# Patient Record
Sex: FEMALE | Race: ASIAN | NOT HISPANIC OR LATINO | URBAN - METROPOLITAN AREA
[De-identification: names, ages, dates, MRNs, and addresses within clinical notes are randomized per-mention and may not be internally consistent; named-entity substitution may affect disease eponyms.]

---

## 2017-06-15 ENCOUNTER — EMERGENCY (EMERGENCY)
Facility: HOSPITAL | Age: 22
LOS: 1 days | Discharge: HOME | End: 2017-06-15
Attending: EMERGENCY MEDICINE | Admitting: EMERGENCY MEDICINE
Payer: COMMERCIAL

## 2017-06-15 VITALS
OXYGEN SATURATION: 100 % | TEMPERATURE: 99 F | HEART RATE: 73 BPM | SYSTOLIC BLOOD PRESSURE: 136 MMHG | RESPIRATION RATE: 16 BRPM | DIASTOLIC BLOOD PRESSURE: 78 MMHG

## 2017-06-15 VITALS
OXYGEN SATURATION: 100 % | HEART RATE: 83 BPM | SYSTOLIC BLOOD PRESSURE: 110 MMHG | TEMPERATURE: 98 F | RESPIRATION RATE: 17 BRPM | DIASTOLIC BLOOD PRESSURE: 65 MMHG

## 2017-06-15 PROCEDURE — 99284 EMERGENCY DEPT VISIT MOD MDM: CPT | Mod: 25

## 2017-06-15 RX ORDER — FAMOTIDINE 10 MG/ML
20 INJECTION INTRAVENOUS ONCE
Qty: 0 | Refills: 0 | Status: COMPLETED | OUTPATIENT
Start: 2017-06-15 | End: 2017-06-15

## 2017-06-15 RX ORDER — ACETAMINOPHEN 500 MG
650 TABLET ORAL ONCE
Qty: 0 | Refills: 0 | Status: COMPLETED | OUTPATIENT
Start: 2017-06-15 | End: 2017-06-15

## 2017-06-15 RX ADMIN — Medication 30 MILLILITER(S): at 05:39

## 2017-06-15 RX ADMIN — Medication 650 MILLIGRAM(S): at 05:39

## 2017-06-15 RX ADMIN — FAMOTIDINE 20 MILLIGRAM(S): 10 INJECTION INTRAVENOUS at 05:39

## 2017-06-15 NOTE — ED PROVIDER NOTE - MEDICAL DECISION MAKING DETAILS
21F PMh hypothyroid p/w upper abd pain worse after spicy food and b/l lower back pain. Vitals wnl, exam notable for back reproducible ttp. benign abd. Neurovascularly intact.  abd: Likely gastritis/gerd/PUD. Clinically not pancreatitis, stephenie or other acute intraabd infection.  Back pain: Likely msk.  Tylenol, pepcid, maalox, reassess. Likely outpt pmd f/u.

## 2017-06-15 NOTE — ED ADULT NURSE NOTE - OBJECTIVE STATEMENT
Patient received in room 2. Pt. is A&O x3 and ambulatory at baseline. Pt. c/o lower abdominal pain for the past 2 days. Pt. states the pain is intermittent. Pt. states spicy food worsens the pain and pepto bismol helped. Pt. states her last bowel movement was at 11 pm last night and that is has been "hard for me to go." Pt. states she slipped and fell down 6 stairs 3 days ago, denies loss of consciousness and hitting her head. No acute distress at this time. Family at bedside. Will continue to monitor.

## 2017-06-15 NOTE — ED PROVIDER NOTE - PHYSICAL EXAMINATION
no LE edema, normal equal distal pulses.  b/l paraspinal lumbar region ttp  no spinal ttp, FROM all extremities, strength 5/5, steady unassisted gait

## 2017-06-15 NOTE — ED PROVIDER NOTE - OBJECTIVE STATEMENT
21F PMh hypothyroid p/w b/l lower back pain and abd pain. Slipped and fell down 5-6 stairs a few d ago, now c/o b/l lower back pain x2d. Also c/o minimal b/l upper abd pain, intermittent, worse after spicy food. No f/c, NVD, URI symptoms, SOB/CP, urinary complaints, weakness/numbness. took peptobismol w/ some relief. Also c/o constipation and straining to have BM, last normal BM yesterday.

## 2017-07-26 ENCOUNTER — EMERGENCY (EMERGENCY)
Facility: HOSPITAL | Age: 22
LOS: 1 days | Discharge: HOME | End: 2017-07-26
Attending: EMERGENCY MEDICINE | Admitting: EMERGENCY MEDICINE
Payer: COMMERCIAL

## 2017-07-26 VITALS
SYSTOLIC BLOOD PRESSURE: 123 MMHG | DIASTOLIC BLOOD PRESSURE: 81 MMHG | TEMPERATURE: 98 F | HEART RATE: 64 BPM | OXYGEN SATURATION: 100 % | RESPIRATION RATE: 16 BRPM

## 2017-07-26 PROCEDURE — 71020: CPT | Mod: 26

## 2017-07-26 PROCEDURE — 99284 EMERGENCY DEPT VISIT MOD MDM: CPT | Mod: 25

## 2017-07-26 NOTE — ED PROVIDER NOTE - ATTENDING CONTRIBUTION TO CARE
I performed a face to face evaluation of this patient and performed a full history and physical examination on the patient.  I agree with the resident's history, physical examination, and plan of the patient.  20yo F with congetsion/cough x 5d, p/w R 10/11 rib pain with cough and inspiration, denies injury, no fever, no chest pain, (+) mild nausea and vomiting with pain. (+) sick contacts  On exam awake & alert, PERRL, mmm, lungs CTAB no wheeze no crackle, tender across R 10/11 ribs anteriorly, RRR, abdomen soft NT/ND no rebound no guarding, no CVA tenderness, no edema, no calf tenderness, 2+ pulses b/l, neuro A&Ox3,skin warm and dry no rash

## 2017-07-26 NOTE — ED ADULT NURSE NOTE - CHIEF COMPLAINT QUOTE
c/o right rib pain for past 2 days. worse with movement and inspiration. denies chest pain/sob. denies any urinary symptoms. states had a cold recently and has been coughing a lot.

## 2017-07-26 NOTE — ED PROVIDER NOTE - OBJECTIVE STATEMENT
22yo F hx hypothyroidism p/w rib pain x 3 days. Pain is sharp, non radiating, reproducible and localized directly over the R lateral 11th/10th rib. Has been getting progressively worse especially with inspiration. Describes pain has spread to her back as well.  Miguelangel chest pain, n/v/fever, sob. OF note, pt had a serious viral infection last week w/ a resolving dry cough. 20yo F hx hypothyroidism p/w rib pain x 3 days. Pain is sharp, non radiating, reproducible and localized directly over the R lateral 11th/10th rib. Has been getting progressively worse especially with inspiration. Describes pain has spread to her back as well. Of note, pt had a serious viral infection last week w/ a resolving dry cough. Miguelangel chest pain, n/v, fever, sob.

## 2019-01-20 ENCOUNTER — EMERGENCY (EMERGENCY)
Facility: HOSPITAL | Age: 24
LOS: 1 days | Discharge: HOME | End: 2019-01-20
Attending: EMERGENCY MEDICINE | Admitting: EMERGENCY MEDICINE
Payer: COMMERCIAL

## 2019-01-20 VITALS
OXYGEN SATURATION: 97 % | RESPIRATION RATE: 16 BRPM | SYSTOLIC BLOOD PRESSURE: 116 MMHG | HEART RATE: 107 BPM | TEMPERATURE: 99 F | DIASTOLIC BLOOD PRESSURE: 71 MMHG

## 2019-01-20 VITALS
DIASTOLIC BLOOD PRESSURE: 62 MMHG | SYSTOLIC BLOOD PRESSURE: 108 MMHG | HEART RATE: 88 BPM | TEMPERATURE: 99 F | RESPIRATION RATE: 16 BRPM | OXYGEN SATURATION: 99 %

## 2019-01-20 LAB
ALBUMIN SERPL ELPH-MCNC: 4.3 G/DL — SIGNIFICANT CHANGE UP (ref 3.3–5)
ALP SERPL-CCNC: 64 U/L — SIGNIFICANT CHANGE UP (ref 40–120)
ALT FLD-CCNC: 22 U/L — SIGNIFICANT CHANGE UP (ref 4–33)
ANION GAP SERPL CALC-SCNC: 13 MMO/L — SIGNIFICANT CHANGE UP (ref 7–14)
APPEARANCE UR: CLEAR — SIGNIFICANT CHANGE UP
AST SERPL-CCNC: 16 U/L — SIGNIFICANT CHANGE UP (ref 4–32)
BACTERIA # UR AUTO: NEGATIVE — SIGNIFICANT CHANGE UP
BASOPHILS # BLD AUTO: 0.02 K/UL — SIGNIFICANT CHANGE UP (ref 0–0.2)
BASOPHILS NFR BLD AUTO: 0.3 % — SIGNIFICANT CHANGE UP (ref 0–2)
BILIRUB SERPL-MCNC: 0.3 MG/DL — SIGNIFICANT CHANGE UP (ref 0.2–1.2)
BILIRUB UR-MCNC: NEGATIVE — SIGNIFICANT CHANGE UP
BLOOD UR QL VISUAL: NEGATIVE — SIGNIFICANT CHANGE UP
BUN SERPL-MCNC: 9 MG/DL — SIGNIFICANT CHANGE UP (ref 7–23)
CALCIUM SERPL-MCNC: 9 MG/DL — SIGNIFICANT CHANGE UP (ref 8.4–10.5)
CHLORIDE SERPL-SCNC: 102 MMOL/L — SIGNIFICANT CHANGE UP (ref 98–107)
CO2 SERPL-SCNC: 21 MMOL/L — LOW (ref 22–31)
COLOR SPEC: YELLOW — SIGNIFICANT CHANGE UP
CREAT SERPL-MCNC: 0.76 MG/DL — SIGNIFICANT CHANGE UP (ref 0.5–1.3)
EOSINOPHIL # BLD AUTO: 0.02 K/UL — SIGNIFICANT CHANGE UP (ref 0–0.5)
EOSINOPHIL NFR BLD AUTO: 0.3 % — SIGNIFICANT CHANGE UP (ref 0–6)
GLUCOSE SERPL-MCNC: 93 MG/DL — SIGNIFICANT CHANGE UP (ref 70–99)
GLUCOSE UR-MCNC: NEGATIVE — SIGNIFICANT CHANGE UP
HCT VFR BLD CALC: 40.4 % — SIGNIFICANT CHANGE UP (ref 34.5–45)
HGB BLD-MCNC: 13.1 G/DL — SIGNIFICANT CHANGE UP (ref 11.5–15.5)
HYALINE CASTS # UR AUTO: SIGNIFICANT CHANGE UP
IMM GRANULOCYTES NFR BLD AUTO: 0.7 % — SIGNIFICANT CHANGE UP (ref 0–1.5)
KETONES UR-MCNC: NEGATIVE — SIGNIFICANT CHANGE UP
LEUKOCYTE ESTERASE UR-ACNC: NEGATIVE — SIGNIFICANT CHANGE UP
LYMPHOCYTES # BLD AUTO: 0.61 K/UL — LOW (ref 1–3.3)
LYMPHOCYTES # BLD AUTO: 10.1 % — LOW (ref 13–44)
MCHC RBC-ENTMCNC: 27.5 PG — SIGNIFICANT CHANGE UP (ref 27–34)
MCHC RBC-ENTMCNC: 32.4 % — SIGNIFICANT CHANGE UP (ref 32–36)
MCV RBC AUTO: 84.7 FL — SIGNIFICANT CHANGE UP (ref 80–100)
MONOCYTES # BLD AUTO: 0.56 K/UL — SIGNIFICANT CHANGE UP (ref 0–0.9)
MONOCYTES NFR BLD AUTO: 9.3 % — SIGNIFICANT CHANGE UP (ref 2–14)
NEUTROPHILS # BLD AUTO: 4.8 K/UL — SIGNIFICANT CHANGE UP (ref 1.8–7.4)
NEUTROPHILS NFR BLD AUTO: 79.3 % — HIGH (ref 43–77)
NITRITE UR-MCNC: NEGATIVE — SIGNIFICANT CHANGE UP
NRBC # FLD: 0 K/UL — LOW (ref 25–125)
PH UR: 6 — SIGNIFICANT CHANGE UP (ref 5–8)
PLATELET # BLD AUTO: 248 K/UL — SIGNIFICANT CHANGE UP (ref 150–400)
PMV BLD: 11.7 FL — SIGNIFICANT CHANGE UP (ref 7–13)
POTASSIUM SERPL-MCNC: 3.7 MMOL/L — SIGNIFICANT CHANGE UP (ref 3.5–5.3)
POTASSIUM SERPL-SCNC: 3.7 MMOL/L — SIGNIFICANT CHANGE UP (ref 3.5–5.3)
PROT SERPL-MCNC: 7.5 G/DL — SIGNIFICANT CHANGE UP (ref 6–8.3)
PROT UR-MCNC: 30 — SIGNIFICANT CHANGE UP
RBC # BLD: 4.77 M/UL — SIGNIFICANT CHANGE UP (ref 3.8–5.2)
RBC # FLD: 13.9 % — SIGNIFICANT CHANGE UP (ref 10.3–14.5)
RBC CASTS # UR COMP ASSIST: HIGH (ref 0–?)
SODIUM SERPL-SCNC: 136 MMOL/L — SIGNIFICANT CHANGE UP (ref 135–145)
SP GR SPEC: 1.02 — SIGNIFICANT CHANGE UP (ref 1–1.04)
SQUAMOUS # UR AUTO: SIGNIFICANT CHANGE UP
UROBILINOGEN FLD QL: NORMAL — SIGNIFICANT CHANGE UP
WBC # BLD: 6.05 K/UL — SIGNIFICANT CHANGE UP (ref 3.8–10.5)
WBC # FLD AUTO: 6.05 K/UL — SIGNIFICANT CHANGE UP (ref 3.8–10.5)
WBC UR QL: SIGNIFICANT CHANGE UP (ref 0–?)

## 2019-01-20 PROCEDURE — 99284 EMERGENCY DEPT VISIT MOD MDM: CPT

## 2019-01-20 PROCEDURE — 74176 CT ABD & PELVIS W/O CONTRAST: CPT | Mod: 26

## 2019-01-20 RX ORDER — SODIUM CHLORIDE 9 MG/ML
1000 INJECTION INTRAMUSCULAR; INTRAVENOUS; SUBCUTANEOUS ONCE
Qty: 0 | Refills: 0 | Status: COMPLETED | OUTPATIENT
Start: 2019-01-20 | End: 2019-01-20

## 2019-01-20 RX ORDER — CEFTRIAXONE 500 MG/1
1 INJECTION, POWDER, FOR SOLUTION INTRAMUSCULAR; INTRAVENOUS ONCE
Qty: 0 | Refills: 0 | Status: COMPLETED | OUTPATIENT
Start: 2019-01-20 | End: 2019-01-20

## 2019-01-20 RX ORDER — CEFPODOXIME PROXETIL 100 MG
1 TABLET ORAL
Qty: 28 | Refills: 0 | OUTPATIENT
Start: 2019-01-20 | End: 2019-02-02

## 2019-01-20 RX ORDER — ACETAMINOPHEN 500 MG
650 TABLET ORAL ONCE
Qty: 0 | Refills: 0 | Status: COMPLETED | OUTPATIENT
Start: 2019-01-20 | End: 2019-01-20

## 2019-01-20 RX ADMIN — Medication 650 MILLIGRAM(S): at 12:18

## 2019-01-20 RX ADMIN — SODIUM CHLORIDE 2000 MILLILITER(S): 9 INJECTION INTRAMUSCULAR; INTRAVENOUS; SUBCUTANEOUS at 12:19

## 2019-01-20 RX ADMIN — CEFTRIAXONE 100 GRAM(S): 500 INJECTION, POWDER, FOR SOLUTION INTRAMUSCULAR; INTRAVENOUS at 13:45

## 2019-01-20 NOTE — ED PROVIDER NOTE - ATTENDING CONTRIBUTION TO CARE
Rupinder: I have seen and examined the patient face to face, have reviewed and addended the HPI, PE and a/p as necessary.     22 yo F a/w UTI 3 days ago noted have increasing lower back pain and fevers despite being on Cipro.  Patient reports having worsening back pain and fevers last night with some chest pain described as tightness that resolved with aspirin.  Pt denied h/o kidney stone or early cardiac death in the family.      GEN - NAD; well appearing; A+O x3; non-toxic appearing CARD -s1s2, RRR, no M,G,R; PULM - CTA b/l, symmetric breath sounds; ABD:  +BS, ND, ttp in lower abd pain, soft, no guarding, no rebound, no masses; BACK: + CVA tenderness, Normal  spine; EXT: symmetric pulses, 2+ dp, capillary refill < 2 seconds, no clubbing, no cyanosis, no edema     22 yo F a/w UTI and lower back pain possible pyelonephritis. will check ua and eval for stone with ct.    - pain control, tylenol motrin, ct   - ceftriaxone,   - possible dc if no stone with cefpodoxime given no relief with cipro.

## 2019-01-20 NOTE — ED PROVIDER NOTE - NSFOLLOWUPINSTRUCTIONS_ED_ALL_ED_FT
Please seek care if you have new chest pain, shortness of breath, fevers, inability to eat or drink, or worsening of symptoms that brought you to the emergency room today.  Please follow up with your primary care physician in 1-2 days or as soon as possible.    Follow up with your primary care doctor regarding a pericardial cyst found on your CT scan.    Take acetaminophen 650mg every 6 hours as needed for pain or fever. See the drug information panel on the packaging for more information.    Take cefpodoxime, 200mg twice a day for 2 weeks, sent to your pharmacy.

## 2019-01-20 NOTE — ED PROVIDER NOTE - PROGRESS NOTE DETAILS
Nam pgy1. Pt feeling better, tolerated PO challenge, repeat vitals improved. Plan to dc home with cefpodoxime and PMD follow up for pyelo and pericardial cyst

## 2019-01-20 NOTE — ED PROVIDER NOTE - OBJECTIVE STATEMENT
23 F c/o abdominal pain, nausea started suddenly 3 days ago, at that time received cipro for a UTI from her PMD. She continues to have ABd pain and now flank pain b/l, chills and night sweats, and chest pain (radiates from left to right side, nonexertional, lasted 45 minutes resolved with aspirin) that starts after taking the cipro. Has not had any vomiting, blood in stool or urine, change in PO intake. No hx of kidney stone or early cardiac death in the family. Has never been pregnant before, sexually active with male partner without any contraception, occasional condom use. Former smoker.

## 2019-01-20 NOTE — ED PROVIDER NOTE - NSFOLLOWUPCLINICS_GEN_ALL_ED_FT
[facility]  Cardiology  [address] [place] [address]  Phone: [phone]  Fax:   Follow Up Time:

## 2019-01-20 NOTE — ED PROVIDER NOTE - MEDICAL DECISION MAKING DETAILS
23 F presenting with abdominal pain and dysuria x 3 days, worsening over that time w/ chills and night sweats. Likely due to UTI transitioning into pyelonephritis given  hx of recent UTI and continued ABD pain, fevers, tachycardia and chills. Chest pain is unlikely ACS as pt has few risk factors, young age, chest pain is in the setting of taking medication and not exertional. Plan: IVF, abx, analgesia

## 2019-01-21 LAB — SPECIMEN SOURCE: SIGNIFICANT CHANGE UP

## 2019-01-22 LAB — BACTERIA UR CULT: SIGNIFICANT CHANGE UP

## 2019-01-24 NOTE — ED POST DISCHARGE NOTE - DETAILS
Spoke to patient regarding recommendations from radiologist, states she has an appointment tomorrow with a cardiologist, advised to mention recommendation at appointment. States she understood and agreed. All question and concerns addressed. No complaints noted.

## 2019-01-24 NOTE — ED POST DISCHARGE NOTE - RESULT SUMMARY
Contacted by Radiology regarding recommendation for patient to have either MRI w/ contrast or CT w/ contrast of chest for further evaluation of pericardial cyst.

## 2019-02-16 ENCOUNTER — EMERGENCY (EMERGENCY)
Facility: HOSPITAL | Age: 24
LOS: 1 days | Discharge: HOME | End: 2019-02-16
Attending: EMERGENCY MEDICINE | Admitting: EMERGENCY MEDICINE
Payer: COMMERCIAL

## 2019-02-16 VITALS
HEART RATE: 80 BPM | RESPIRATION RATE: 18 BRPM | TEMPERATURE: 97 F | DIASTOLIC BLOOD PRESSURE: 79 MMHG | SYSTOLIC BLOOD PRESSURE: 121 MMHG | OXYGEN SATURATION: 100 %

## 2019-02-16 LAB
APPEARANCE UR: CLEAR — SIGNIFICANT CHANGE UP
BACTERIA # UR AUTO: NEGATIVE — SIGNIFICANT CHANGE UP
BILIRUB UR-MCNC: NEGATIVE — SIGNIFICANT CHANGE UP
BLOOD UR QL VISUAL: SIGNIFICANT CHANGE UP
COLOR SPEC: SIGNIFICANT CHANGE UP
GLUCOSE UR-MCNC: NEGATIVE — SIGNIFICANT CHANGE UP
HYALINE CASTS # UR AUTO: NEGATIVE — SIGNIFICANT CHANGE UP
KETONES UR-MCNC: NEGATIVE — SIGNIFICANT CHANGE UP
LEUKOCYTE ESTERASE UR-ACNC: SIGNIFICANT CHANGE UP
NITRITE UR-MCNC: POSITIVE — HIGH
PH UR: 6.5 — SIGNIFICANT CHANGE UP (ref 5–8)
PROT UR-MCNC: NEGATIVE — SIGNIFICANT CHANGE UP
RBC CASTS # UR COMP ASSIST: SIGNIFICANT CHANGE UP (ref 0–?)
SP GR SPEC: 1.01 — SIGNIFICANT CHANGE UP (ref 1–1.04)
SQUAMOUS # UR AUTO: SIGNIFICANT CHANGE UP
UROBILINOGEN FLD QL: NORMAL — SIGNIFICANT CHANGE UP
WBC UR QL: HIGH (ref 0–?)

## 2019-02-16 PROCEDURE — 99283 EMERGENCY DEPT VISIT LOW MDM: CPT

## 2019-02-16 RX ORDER — IBUPROFEN 200 MG
600 TABLET ORAL ONCE
Qty: 0 | Refills: 0 | Status: COMPLETED | OUTPATIENT
Start: 2019-02-16 | End: 2019-02-16

## 2019-02-16 RX ORDER — CEFPODOXIME PROXETIL 100 MG
1 TABLET ORAL
Qty: 14 | Refills: 0 | OUTPATIENT
Start: 2019-02-16 | End: 2019-02-22

## 2019-02-16 RX ADMIN — Medication 600 MILLIGRAM(S): at 10:22

## 2019-02-16 NOTE — ED PROVIDER NOTE - NSFOLLOWUPINSTRUCTIONS_ED_ALL_ED_FT
-Take cefpodoxime 100mg twice a day for 7 days.  -Follow up with your primary care provider in 24-48 hours.   -Please return to the Emergency Department if you experience any new/worsening symptoms, including but are not limited to: no improvement in 2 or 3 days, unrelenting nausea, vomiting, fever, chills, worsening abdominal pain, worsening back pain, or any other concerning symptoms.

## 2019-02-16 NOTE — ED PROVIDER NOTE - NS ED ROS FT
Esha Gifford MD:   General: denies fever, chills  HENT: denies nasal congestion, sore throat, rhinorrhea  Eyes: denies vision changes  CV: denies chest pain  Resp: denies difficulty breathing, cough  Abdominal: denies nausea, vomiting, diarrhea, abdominal pain, blood in stool, dark stool  : +pain with urination  MSK: denies recent trauma  Neuro: denies headaches, numbness, tingling, dizziness, lightheadedness.  Skin: denies new rashes  Endocrine: denies recent weight loss

## 2019-02-16 NOTE — ED PROVIDER NOTE - CLINICAL SUMMARY MEDICAL DECISION MAKING FREE TEXT BOX
After Visit Summary   1/16/2017    Bushra Levy    MRN: [id number]           Patient Information     Date Of Birth          1962        Visit Information        Provider Department      1/16/2017 10:20 AM Yanet Rossi,  [facility]        Today's Diagnoses     Anxiety    -  1     Epigastric pain           Care Instructions    We will send you to the gastroenterologist for further evaluation of your stomach pain.         Follow-ups after your visit        Additional Services     GASTROENTEROLOGY ADULT REFERRAL +/- PROCEDURE       Patient is in room number: 19    Location: [facility] - Phone: [phone] - Fax: [phone]  Arnolduage: Jesus                  Your next 10 appointments already scheduled     Feb 27, 2017 10:30 AM   LAB VISIT with [facility]   [facility] ([facility])    [address] [place] [address]   [phone]           If you are coming in for fasting labs, you will need to fast for 10-12 hours prior to your appt. Fasting labs include lipids, cholesterol, glucose, complete metabolic panel, basic metabolic panel, and triglycerides. Do not drink coffee or any other fluids. Water with medications are okay. Do not chew gum as well. If you have any further questions, please contact your health care team.                  Future tests that were ordered for you today     Open Future Orders        Priority Expected Expires Ordered    GASTROENTEROLOGY ADULT REFERRAL +/- PROCEDURE Routine  1/16/2018 1/16/2017            Who to contact     Please call your clinic at [phone] to:    Ask questions about your health    Make or cancel appointments    Discuss your medicines    Learn about your test results    Speak to your doctor   If you have compliments or concerns about an experience at your clinic, or if you wish to file a complaint, please contact [facility] Patient Relations at [phone] or email us at  [email or web address]         Additional Information About Your Visit        Smove Information     Smove is an electronic gateway that provides easy, online access to your medical records. With Smove, you can request a clinic appointment, read your test results, renew a prescription or communicate with your care team.     To sign up for Smove visit the website at [email or web address]   You will be asked to enter the access code listed below, as well as some personal information. Please follow the directions to create your username and password.     Your access code is: [email or web address]  Expires: 3/22/2017 11:35 AM     Your access code will  in 90 days. If you need help or a new code, please contact your [facility] or call [phone] for assistance.        Care EveryWhere ID     This is your Care EveryWhere ID. This could be used by other organizations to access your [place] medical records  [id number]        Your Vitals Were     Pulse Temperature Respirations Height BMI (Body Mass Index) Pulse Oximetry    82 98.3  F (36.8  C) (Oral) 18 4' 11\" (149.9 cm) 27.17 kg/m2 97%       Blood Pressure from Last 3 Encounters:   17 136/82   16 136/80   16 151/90    Weight from Last 3 Encounters:   17 134 lb 9.6 oz (61.054 kg)   16 131 lb 6.4 oz (59.603 kg)   16 135 lb 9.6 oz (61.508 kg)                 Today's Medication Changes          These changes are accurate as of: 17 11:12 AM.  If you have any questions, ask your nurse or doctor.               Start taking these medicines.        Dose/Directions    omeprazole 40 MG capsule   Commonly known as:  priLOSEC   Used for:  Epigastric pain   Started by:  Yanet Rossi DO        Dose:  40 mg   Take 1 capsule (40 mg) by mouth daily Take 30-60 minutes before a meal.   Quantity:  30 capsule   Refills:  1         These medicines have changed or have updated prescriptions.     "    Dose/Directions    escitalopram 20 MG tablet   Commonly known as:  LEXAPRO   This may have changed:    - medication strength  - how much to take   Used for:  Anxiety   Changed by:  Yanet Rossi DO        Dose:  20 mg   Take 1 tablet (20 mg) by mouth daily   Quantity:  90 tablet   Refills:  1            Where to get your medicines      These medications were sent to [id number] - [place], [place] - [address] [place] [address] AT [facility]  [address], [place] [address]     Phone:  [phone]    - escitalopram 20 MG tablet  - omeprazole 40 MG capsule             Primary Care Provider Office Phone # Fax #    Yanet Marva Rossi [phone]       [facility] [address] [facility] [address]        Thank you!     Thank you for choosing [facility]  for your care. Our goal is always to provide you with excellent care. Hearing back from our patients is one way we can continue to improve our services. Please take a few minutes to complete the written survey that you may receive in the mail after your visit with us. Thank you!             Your Updated Medication List - Protect others around you: Learn how to safely use, store and throw away your medicines at www.disposemymeds.org.          This list is accurate as of: 1/16/17 11:12 AM.  Always use your most recent med list.                   Brand Name Dispense Instructions for use    blood glucose monitoring lancets     1 Box    Use to test blood sugar 2 times daily or as directed.       blood glucose monitoring meter device kit     1 kit    Use to test blood sugars 1-2 times daily or as directed.       blood glucose monitoring test strip    ACCU-CHEK JACQUELIN    100 strip    Use to test blood sugars 2 times daily or as directed.       cholecalciferol 1000 UNIT tablet    vitamin D    60 tablet    Take 1 tablet (1,000 Units) by mouth daily       escitalopram 20 MG tablet     LEXAPRO    90 tablet    Take 1 tablet (20 mg) by mouth daily       IBUPROFEN PO      Take 2 tablets by mouth 3 times daily       loratadine 10 MG tablet    CLARITIN    90 tablet    Take 1 tablet (10 mg) by mouth daily as needed for allergies       omeprazole 40 MG capsule    priLOSEC    30 capsule    Take 1 capsule (40 mg) by mouth daily Take 30-60 minutes before a meal.          Esha Gifford MD: 24yo F with PMH of hypothyroidism presents with pelvic pain with urinary symptoms since 6AM today. Pt hemodynamically stable, afebrile. No pelvic or abd TTP on exam. No cervical motion tenderness or adnexal tenderness on exam. Unlikely to be PID. Concern for UTI vs pyelonephritis. Plan: UA, UC, pain control.

## 2019-02-16 NOTE — ED PROVIDER NOTE - OBJECTIVE STATEMENT
Esha Gifford MD: 22yo F with PMH of hypothyroidism presents with pelvic pain since 6AM today. Pain is constant burning sensation in pelvic area, woke pt out of her sleep, associated with dysuria, urinary frequency and hematuria, chills, non radiating, relief with phenazopyridine. Recent UTI 3 weeks ago treated with cefpodoxime. No fever, SOB, CP, N/V/D, syncope, lightheadedness, abdominal pain, vaginal bleeding/discharge, hematochezia, melena. Pt is sexually active with one partner, no hx of STDs.

## 2019-02-16 NOTE — ED ADULT TRIAGE NOTE - CHIEF COMPLAINT QUOTE
p/w with pelvic pain, painful urination and dysuria since this AM, recent UTI.  +blood in urine.  Pt denies fevers, chills, cr any other complaints at this time.

## 2019-02-16 NOTE — ED PROVIDER NOTE - ATTENDING CONTRIBUTION TO CARE
DR. HALL, ATTENDING MD-  I performed a face to face bedside interview with patient regarding history of present illness, review of symptoms and past medical history. I completed an independent physical exam.  I have discussed patient's plan of care with the resident.   Documentation as above in the note.    22 y/o female h/o hypothyroidism, recent uti tx with cefpodoxime 3 wks prior with good effect here with suprapubic discomfort, inc urinary freq, dysuria, chills.  Denies ha, neck stiffness, cp, sob, cough, abd pain, n/v/d, rash.  Afebrile, vs wnl, nad, ctabil, s1s2 rrr no m/r/g, abd soft mild suprapubic ttp no r/g, no cva tenderness b/l, no leg swelling b/l, no rash.  Concern for uti.  Obtain ucg, ua, ucx, antic dc with pcp f/u as o/p.

## 2019-02-16 NOTE — ED PROVIDER NOTE - PHYSICAL EXAMINATION
Esha Gifford MD:   CONSTITUTIONAL: Nontoxic, well nourished, well developed, young female, resting comfortably in no acute distress  HEAD: Normocephalic; atraumatic  EYES: Normal inspection, EOMI  ENMT: External appears normal; normal oropharynx  NECK: Supple; non-tender; no cervical lymphadenopathy  CARD: RRR; no audible murmurs, rubs, or gallops  RESP: No respiratory distress, lungs ctab/l  ABD: Soft, non-distended; non-tender; no rebound or guarding.   : No CVAT. No pelvic TTP.   EXT: No LE pitting edema or calf tenderness; distal pulses intact with good capillary refill  SKIN: Warm, dry, intact  NEURO: aaox3, moving all extremities spontaneously   : Chaperone Jennifer medical student. External genitalia unremarkable. Speculum exam with normal appearing whitish vaginal discharge. Vaginal wall mucosa is unremarkable.  Cervix visualized and is unremarkable (closed in appearance without any protruding material).  Bimanual exam without cervical motion tenderness, adnexal tenderness or any masses appreciated. Esha Gifford MD:   CONSTITUTIONAL: Nontoxic, well nourished, well developed, young female, resting comfortably in no acute distress  HEAD: Normocephalic; atraumatic  EYES: Normal inspection, EOMI  ENMT: External appears normal; normal oropharynx  NECK: Supple; non-tender; no cervical lymphadenopathy  CARD: RRR; no audible murmurs, rubs, or gallops  RESP: No respiratory distress, lungs ctab/l  ABD: Soft, non-distended; non-tender; no rebound or guarding.   : No CVAT. No pelvic TTP.   EXT: No LE pitting edema or calf tenderness; distal pulses intact with good capillary refill  SKIN: Warm, dry, intact  NEURO: aaox3, moving all extremities spontaneously   : Chaperone Jennifer medical student, Dr. Etienne attending physician. External genitalia unremarkable. Speculum exam with normal appearing whitish vaginal discharge. Vaginal wall mucosa is unremarkable.  Cervix visualized and is unremarkable (closed in appearance without any protruding material).  Bimanual exam without cervical motion tenderness, adnexal tenderness or any masses appreciated.

## 2019-02-17 LAB — SPECIMEN SOURCE: SIGNIFICANT CHANGE UP

## 2019-02-18 LAB
-  AMPICILLIN: SIGNIFICANT CHANGE UP
-  CIPROFLOXACIN: SIGNIFICANT CHANGE UP
-  NITROFURANTOIN: SIGNIFICANT CHANGE UP
-  TETRACYCLINE: SIGNIFICANT CHANGE UP
-  VANCOMYCIN: SIGNIFICANT CHANGE UP
BACTERIA UR CULT: SIGNIFICANT CHANGE UP
METHOD TYPE: SIGNIFICANT CHANGE UP
ORGANISM # SPEC MICROSCOPIC CNT: SIGNIFICANT CHANGE UP
ORGANISM # SPEC MICROSCOPIC CNT: SIGNIFICANT CHANGE UP

## 2019-02-19 RX ORDER — NITROFURANTOIN MACROCRYSTAL 50 MG
1 CAPSULE ORAL
Qty: 20 | Refills: 0 | OUTPATIENT
Start: 2019-02-19 | End: 2019-02-28

## 2019-02-19 NOTE — ED POST DISCHARGE NOTE - ADDITIONAL DOCUMENTATION
Tay WANG, PGY-4: pt requesting abx to be sent to different [facility] pharmacy. Resent rx for macrobid and cancelled earlier rx.

## 2020-02-05 ENCOUNTER — EMERGENCY (EMERGENCY)
Facility: HOSPITAL | Age: 25
LOS: 1 days | Discharge: HOME | End: 2020-02-05
Attending: EMERGENCY MEDICINE | Admitting: EMERGENCY MEDICINE
Payer: COMMERCIAL

## 2020-02-05 VITALS — WEIGHT: 197.98 LBS | HEIGHT: 67 IN

## 2020-02-05 VITALS
DIASTOLIC BLOOD PRESSURE: 73 MMHG | RESPIRATION RATE: 16 BRPM | HEART RATE: 85 BPM | TEMPERATURE: 98 F | SYSTOLIC BLOOD PRESSURE: 113 MMHG | OXYGEN SATURATION: 100 %

## 2020-02-05 LAB — TSH SERPL-MCNC: 14.4 UIU/ML — HIGH (ref 0.27–4.2)

## 2020-02-05 PROCEDURE — 99284 EMERGENCY DEPT VISIT MOD MDM: CPT | Mod: 25

## 2020-02-05 PROCEDURE — 93308 TTE F-UP OR LMTD: CPT | Mod: 26

## 2020-02-05 PROCEDURE — 71046 X-RAY EXAM CHEST 2 VIEWS: CPT | Mod: 26

## 2020-02-05 RX ORDER — IBUPROFEN 200 MG
600 TABLET ORAL ONCE
Refills: 0 | Status: COMPLETED | OUTPATIENT
Start: 2020-02-05 | End: 2020-02-05

## 2020-02-05 RX ADMIN — Medication 600 MILLIGRAM(S): at 11:45

## 2020-02-05 NOTE — ED ADULT NURSE NOTE - OBJECTIVE STATEMENT
Patient c/o chest pain last night and SOB today. Patient denies pain at this time. Patient AOx4 and ambulatory. Patient stable at this time. Cxray complete. Patient for re-eval

## 2020-02-05 NOTE — ED PROVIDER NOTE - PROGRESS NOTE DETAILS
HERIBERTO Velez: Upon reevaluation patient currently feels better has no complaints. Pt was advised to follow up with endocrinologist/PCP for TSH and hypothyroidism evaluation

## 2020-02-05 NOTE — ED PROVIDER NOTE - OBJECTIVE STATEMENT
24 y.o female with a PMHx of hypothyroidism-non-compliant with medication, presents to the ED complaining of having substernal over past few months worsening over the past day. Pt states the chest pain is mostly substernal radiating to the left shoulder. Pt states that the pain last night was a 8/10 for which she took tylenol which helped improve the pain. Pt currently states that is also has some associated SOB with the chest pain which actually improves with ambulation. pt currently denies having any OCP usage, recent travel, leg swelling, headaches, dizziness, nausea, vomiting, diarrhea, abdominal pain. Pt states that she vape for the past 2 years.

## 2020-02-05 NOTE — ED PROVIDER NOTE - PATIENT PORTAL LINK FT
You can access the FollowMyHealth Patient Portal offered by [facility] by registering at the following website: [email or web address]. By joining [employer]’s FollowMyHealth portal, you will also be able to view your health information using other applications (apps) compatible with our system.

## 2020-02-05 NOTE — ED ADULT TRIAGE NOTE - CHIEF COMPLAINT QUOTE
A&OX3 c/o midsternal chest pain that radiates to back, " I feel it piercing" reports sob on exertion that began last night, respirations equal and non labored sating 100% room air pt states she has a known pericardial cyst but has not followed up with PCP denies PMH

## 2020-02-05 NOTE — ED PROVIDER NOTE - CARDIAC, MLM
Normal rate, regular rhythm.  Heart sounds S1, S2.  No murmurs, rubs or gallops. reproducible chest pain on exam, in the substernal area.

## 2020-02-05 NOTE — ED PROCEDURE NOTE - PROCEDURE ADDITIONAL DETAILS
Focused ED Transthoracic echo 20395 - indication (      )    Grey scale imaging obtained in four views ( parasternal long, parasternal short, apical and subxiphoid)    No pericardial effusion noted.  No evidence of cardiac tamponade  LV contractility - normal.  EPSS 0.55cm  RV normal size.  IVC 1.93-1.16 cm with sniff.    Impression: no pericardial effusion, normal contractility, RV normal size.   Dexeus [id number].

## 2020-02-05 NOTE — ED PROVIDER NOTE - ATTENDING CONTRIBUTION TO CARE
Pineda: 24 yof with intermittent substernal chest pain for few months, radiating in between shoulder blades, not exertional, improved with twisting motion, no sour taste, no burning, no associated sob. Pt took tylenol with some improvement. Hx of 2cm pericardial cyst, no followup. Pt is well appearing, +tender to palpation over sternum. clear lungs, HR 68, 100% RA, RRR, no edema, no jvd. EKS NSR with no ST t wave changes. Will check cxr r/o ptx, nsaids for sx relief. unlikely to be cad or aorta given age and sxs. outpt cardiology follow up.

## 2020-11-10 NOTE — ED POST DISCHARGE NOTE - RESULT SUMMARY
UCX : Enterococcus faecalis 10,000-49,000CFU/ml Patient discharged home with a prescription for Cefpodoxime which is not listed on S/R profile. Patient contact # [phone] s/w patient will switch to macrobid 100mg BID X 7 days. Patient instructed to follow up with MD for repeat UA/UCX. Discussed with patient need to return to ED if symptoms don't continue to improve or recur or develops any new or worsening symptoms that are of concern. 36.7

## 2021-02-21 ENCOUNTER — EMERGENCY (EMERGENCY)
Facility: HOSPITAL | Age: 26
LOS: 1 days | Discharge: HOME | End: 2021-02-21
Attending: STUDENT IN AN ORGANIZED HEALTH CARE EDUCATION/TRAINING PROGRAM | Admitting: STUDENT IN AN ORGANIZED HEALTH CARE EDUCATION/TRAINING PROGRAM
Payer: COMMERCIAL

## 2021-02-21 VITALS
DIASTOLIC BLOOD PRESSURE: 80 MMHG | HEIGHT: 67 IN | HEART RATE: 86 BPM | RESPIRATION RATE: 16 BRPM | TEMPERATURE: 98 F | OXYGEN SATURATION: 98 % | SYSTOLIC BLOOD PRESSURE: 139 MMHG

## 2021-02-21 VITALS
DIASTOLIC BLOOD PRESSURE: 71 MMHG | RESPIRATION RATE: 16 BRPM | TEMPERATURE: 98 F | OXYGEN SATURATION: 97 % | SYSTOLIC BLOOD PRESSURE: 115 MMHG | HEART RATE: 73 BPM

## 2021-02-21 LAB
ALBUMIN SERPL ELPH-MCNC: 4 G/DL — SIGNIFICANT CHANGE UP (ref 3.3–5)
ALP SERPL-CCNC: 63 U/L — SIGNIFICANT CHANGE UP (ref 40–120)
ALT FLD-CCNC: 19 U/L — SIGNIFICANT CHANGE UP (ref 4–33)
ANION GAP SERPL CALC-SCNC: 13 MMOL/L — SIGNIFICANT CHANGE UP (ref 7–14)
APPEARANCE UR: CLEAR — SIGNIFICANT CHANGE UP
AST SERPL-CCNC: 22 U/L — SIGNIFICANT CHANGE UP (ref 4–32)
BASOPHILS # BLD AUTO: 0.04 K/UL — SIGNIFICANT CHANGE UP (ref 0–0.2)
BASOPHILS NFR BLD AUTO: 0.4 % — SIGNIFICANT CHANGE UP (ref 0–2)
BILIRUB SERPL-MCNC: <0.2 MG/DL — SIGNIFICANT CHANGE UP (ref 0.2–1.2)
BILIRUB UR-MCNC: NEGATIVE — SIGNIFICANT CHANGE UP
BLD GP AB SCN SERPL QL: NEGATIVE — SIGNIFICANT CHANGE UP
BUN SERPL-MCNC: 10 MG/DL — SIGNIFICANT CHANGE UP (ref 7–23)
CALCIUM SERPL-MCNC: 9.3 MG/DL — SIGNIFICANT CHANGE UP (ref 8.4–10.5)
CHLORIDE SERPL-SCNC: 103 MMOL/L — SIGNIFICANT CHANGE UP (ref 98–107)
CO2 SERPL-SCNC: 21 MMOL/L — LOW (ref 22–31)
COLOR SPEC: SIGNIFICANT CHANGE UP
CREAT SERPL-MCNC: 0.62 MG/DL — SIGNIFICANT CHANGE UP (ref 0.5–1.3)
DIFF PNL FLD: NEGATIVE — SIGNIFICANT CHANGE UP
EOSINOPHIL # BLD AUTO: 0.07 K/UL — SIGNIFICANT CHANGE UP (ref 0–0.5)
EOSINOPHIL NFR BLD AUTO: 0.6 % — SIGNIFICANT CHANGE UP (ref 0–6)
GLUCOSE SERPL-MCNC: 76 MG/DL — SIGNIFICANT CHANGE UP (ref 70–99)
GLUCOSE UR QL: NEGATIVE — SIGNIFICANT CHANGE UP
HCG SERPL-ACNC: 114.5 MIU/ML — SIGNIFICANT CHANGE UP
HCT VFR BLD CALC: 39.9 % — SIGNIFICANT CHANGE UP (ref 34.5–45)
HGB BLD-MCNC: 12.7 G/DL — SIGNIFICANT CHANGE UP (ref 11.5–15.5)
IANC: 6.08 K/UL — SIGNIFICANT CHANGE UP (ref 1.5–8.5)
IMM GRANULOCYTES NFR BLD AUTO: 0.5 % — SIGNIFICANT CHANGE UP (ref 0–1.5)
KETONES UR-MCNC: NEGATIVE — SIGNIFICANT CHANGE UP
LEUKOCYTE ESTERASE UR-ACNC: NEGATIVE — SIGNIFICANT CHANGE UP
LYMPHOCYTES # BLD AUTO: 37.8 % — SIGNIFICANT CHANGE UP (ref 13–44)
LYMPHOCYTES # BLD AUTO: 4.22 K/UL — HIGH (ref 1–3.3)
MCHC RBC-ENTMCNC: 27.9 PG — SIGNIFICANT CHANGE UP (ref 27–34)
MCHC RBC-ENTMCNC: 31.8 GM/DL — LOW (ref 32–36)
MCV RBC AUTO: 87.7 FL — SIGNIFICANT CHANGE UP (ref 80–100)
MONOCYTES # BLD AUTO: 0.7 K/UL — SIGNIFICANT CHANGE UP (ref 0–0.9)
MONOCYTES NFR BLD AUTO: 6.3 % — SIGNIFICANT CHANGE UP (ref 2–14)
NEUTROPHILS # BLD AUTO: 6.08 K/UL — SIGNIFICANT CHANGE UP (ref 1.8–7.4)
NEUTROPHILS NFR BLD AUTO: 54.4 % — SIGNIFICANT CHANGE UP (ref 43–77)
NITRITE UR-MCNC: NEGATIVE — SIGNIFICANT CHANGE UP
NRBC # BLD: 0 /100 WBCS — SIGNIFICANT CHANGE UP
NRBC # FLD: 0 K/UL — SIGNIFICANT CHANGE UP
PH UR: 6 — SIGNIFICANT CHANGE UP (ref 5–8)
PLATELET # BLD AUTO: 304 K/UL — SIGNIFICANT CHANGE UP (ref 150–400)
POTASSIUM SERPL-MCNC: 4.3 MMOL/L — SIGNIFICANT CHANGE UP (ref 3.5–5.3)
POTASSIUM SERPL-SCNC: 4.3 MMOL/L — SIGNIFICANT CHANGE UP (ref 3.5–5.3)
PROT SERPL-MCNC: 7.6 G/DL — SIGNIFICANT CHANGE UP (ref 6–8.3)
PROT UR-MCNC: NEGATIVE — SIGNIFICANT CHANGE UP
RBC # BLD: 4.55 M/UL — SIGNIFICANT CHANGE UP (ref 3.8–5.2)
RBC # FLD: 13 % — SIGNIFICANT CHANGE UP (ref 10.3–14.5)
RH IG SCN BLD-IMP: POSITIVE — SIGNIFICANT CHANGE UP
SODIUM SERPL-SCNC: 137 MMOL/L — SIGNIFICANT CHANGE UP (ref 135–145)
SP GR SPEC: 1.02 — SIGNIFICANT CHANGE UP (ref 1.01–1.02)
UROBILINOGEN FLD QL: SIGNIFICANT CHANGE UP
WBC # BLD: 11.17 K/UL — HIGH (ref 3.8–10.5)
WBC # FLD AUTO: 11.17 K/UL — HIGH (ref 3.8–10.5)

## 2021-02-21 PROCEDURE — 93010 ELECTROCARDIOGRAM REPORT: CPT

## 2021-02-21 PROCEDURE — 99285 EMERGENCY DEPT VISIT HI MDM: CPT

## 2021-02-21 PROCEDURE — 76830 TRANSVAGINAL US NON-OB: CPT | Mod: 26

## 2021-02-21 RX ORDER — ACETAMINOPHEN 500 MG
650 TABLET ORAL ONCE
Refills: 0 | Status: COMPLETED | OUTPATIENT
Start: 2021-02-21 | End: 2021-02-21

## 2021-02-21 RX ADMIN — Medication 650 MILLIGRAM(S): at 03:00

## 2021-02-21 NOTE — CONSULT NOTE ADULT - ASSESSMENT
25y F  @4w4d by LMP 20 w/ intermittent generalized abdominal cramping improved with tylenol, and .  TVUS shows no intrauterine pregnancy or adnexal abnormalities.  Labs and vital signs stable.  Differential includes threatened AB vs. ectopic pregnancy vs. viable IUP vs. spontaneous  in progress.  Difficult to assess at this time.      Recs:  - Patient to follow up in 48 hours for repeat b-HCG in the ER  - Patient states she wishes to contact a private GYN for follow up  - Rh type:  O+.    No need for rhogam at this time.   - Ectopic precautions reviewed with patient.  Discussed with patient importance of follow up for b-HCG given unknown pregnancy location at this time.  Patient expressed understanding.  All questions and concerns addressed to patient's apparent satisfaction.   - Patient to be added to GYN b-HCG list for closer follow up.    - Patient stable for d/c home from GYN perspective.  Primary management per ED team.      d/w Dr. Micky Moran, PGY2

## 2021-02-21 NOTE — ED PROVIDER NOTE - ATTENDING CONTRIBUTION TO CARE
26yo F ho hypothyroidism pw 3- 4 days abd cramping, breast tenderness, no vaginal bleeding or discharge, + nausea, no vomiting, no urinary sx  in ED + upreg, lmp 1 month ago  likely symptoms of early pregnancy  will check hcg and obgyn follow up   abd nontender

## 2021-02-21 NOTE — ED ADULT TRIAGE NOTE - CHIEF COMPLAINT QUOTE
LMP 1/25, pt c/o abd cramping x 3 days associated with intermittent dizziness, denies hematuria, took home pregnancy tests that were - LMP 1/25, pt c/o abd cramping radiating into pelvis x 3 days associated with intermittent dizziness, denies hematuria, took home pregnancy tests that were -

## 2021-02-21 NOTE — ED PROVIDER NOTE - OBJECTIVE STATEMENT
24 y/o  presents with lower abd pain x3 days radiating to pelvis, cramping, intermittent, associated with nausea but no vomiting. denies fevers/chills cp/shortness of breath, vaginal bleeding, lmp , does not have ob-gyn.

## 2021-02-21 NOTE — CONSULT NOTE ADULT - ATTENDING COMMENTS
24y/o F  LMP  with pregnancy of unknown location; ,  TVUS shows no intrauterine pregnancy or adnexal abnormalities. Patient stable, to f/u in 48 hours.

## 2021-02-21 NOTE — ED PROVIDER NOTE - NSFOLLOWUPINSTRUCTIONS_ED_ALL_ED_FT
You were seen in the Emergency Department for pregnancy of unknown location.   1) Advance activity as tolerated.    2) Continue all previously prescribed medications as directed.    3) Follow up with your primary care physician in 24-48 hours - take copies of your results.    4) Return to the Emergency Department for worsening or persistent symptoms, and/or ANY NEW OR CONCERNING SYMPTOMS including but not limited to severely worsening pain, severe vaginal bleeding, shortness of breath, chest pain, pallor, loss of consciousness, heart palpitations, severe headache, persistent dizziness, or severe muscle aches/leg cramps.

## 2021-02-21 NOTE — ED ADULT NURSE NOTE - CHIEF COMPLAINT QUOTE
LMP 1/25, pt c/o abd cramping radiating into pelvis x 3 days associated with intermittent dizziness, denies hematuria, took home pregnancy tests that were -

## 2021-02-21 NOTE — CONSULT NOTE ADULT - SUBJECTIVE AND OBJECTIVE BOX
GYN Consult Note    25y  @4w4d by LMP  presents with 4 days of abdominal cramping.  Patient states that cramping feels like period cramps and happens on and off throughout the day.  Upon assessment patient cramping is minimal, and she states the tylenol given by ED helped.  Denies vaginal bleeding.  Denies nausea, vomiting, SOB, headache, fevers, chills.  This is unplanned pregnancy however patient has unprotected sex "often."  Patient currently does not see a GYN physician.         PAST MEDICAL & SURGICAL HISTORY:  Hypothyroidism  No significant past surgical history        REVIEW OF SYSTEMS  General: denies fevers, chills, tiredness  Skin/Breast: denies breast pain  Respiratory and Thorax: denies shortness of breath, denies cough  Cardiovascular: denies chest pain and denies palpitations  Gastrointestinal: denies abdominal pain, nausea/ vomiting	  Genitourinary: denies dysuria, increased urinary frequency, urgency	  Constitutional, Cardiovascular, Respiratory, Gastrointestinal, Genitourinary, Musculoskeletal and Integumentary review of systems are normal except as noted. 	          Allergies  Ciprofloxacin - chest pain, fever        Vital Signs Last 24 Hrs  T(C): 36.7 (2021 05:15), Max: 36.7 (2021 05:15)  T(F): 98.1 (2021 05:15), Max: 98.1 (2021 05:15)  HR: 73 (2021 05:15) (73 - 86)  BP: 139/80 (2021 00:31) (139/80 - 139/80)  BP(mean): --  RR: 16 (2021 05:15) (16 - 16)  SpO2: 97% (2021 05:15) (97% - 98%)    PHYSICAL EXAM:   Gen: NAD, alert and oriented x 3  Cardiovascular: regular   Respiratory: breathing comfortably on RA  Abd: soft, non tender, non-distended  Pelvic: closed/long, no CMT, Uterus: normal size, non tender  Adnexa: non tender, no palpable masses  Extremities: NTBL  Skin: warm and well perfused      LABS:                        12.7   11.17 )-----------( 304      ( 2021 03:24 )             39.9     02-    137  |  103  |  10  ----------------------------<  76  4.3   |  21<L>  |  0.62    Ca    9.3      2021 03:24    TPro  7.6  /  Alb  4.0  /  TBili  <0.2  /  DBili  x   /  AST  22  /  ALT  19  /  AlkPhos  63        Urinalysis Basic - ( 2021 02:47 )    Color: Light Yellow / Appearance: Clear / S.019 / pH: x  Gluc: x / Ketone: Negative  / Bili: Negative / Urobili: <2 mg/dL   Blood: x / Protein: Negative / Nitrite: Negative   Leuk Esterase: Negative / RBC: x / WBC x   Sq Epi: x / Non Sq Epi: x / Bacteria: x    hcHCG Quantitative, Serum (21 @ 03:24)   HCG Quantitative, Serum: 114.5    RADIOLOGY & ADDITIONAL STUDIES:  < from: US Transvaginal (21 @ 03:22) >  EXAM:  US TRANSVAGINAL        PROCEDURE DATE:  2021         INTERPRETATION:  CLINICAL INFORMATION: Pelvic pain, positive urine pregnancy test, pregnancy of unknown location    LMP: 2021    COMPARISON: No similar prior    TECHNIQUE:  Endovaginal pelvic sonogram only. Color and Spectral Doppler was performed.    FINDINGS:    Uterus: 7.6 cm x 4.0 cm x 4.5 cm. no focal parenchymal abnormality. Cervix is long and closed.  Endometrium: 1.3 cm. No intrauterine pregnancy identified.    Right ovary: 4.1 cm x 2.3 cm x 3.0 cm. 1.6 cm hemorrhagic corpus luteum cyst in the right ovary. Normal arterial and venous spectral waveforms.  Left ovary: 3.4 cm x 2.5 cm x 2.8 cm. 2.2 cm hemorrhagic corpus luteum cyst in the left ovary. Normal arterial and venous spectral waveforms.    Fluid: Small amount of simple free fluid adjacent to the right ovary and in the cul-de-sac.    IMPRESSION:    1. Pregnancy of unknown location. Follow-up serial quantitative hCG is advised.  2. Bilateral ovarian hemorrhagic corpus luteum cysts.        < end of copied text >

## 2021-02-21 NOTE — ED ADULT NURSE NOTE - OBJECTIVE STATEMENT
Received patient to rm 26.  Patient is awake, A&O x 3, NAD, presents to ED for abdominal pain.  Experiencing abdominal pain radiates to pelvic area with associated dizziness.  No vaginal bleeding or discharge.  History of hypothyroidism.  20g IV right hand, labs drawn and sent, primary RN, Adam given report.

## 2021-02-21 NOTE — ED PROVIDER NOTE - PHYSICAL EXAMINATION
[Const] well-appearing, resting comfortably, no acute distress  [HEENT] PERRL, EOMI, moist mucus membranes  [Neck] Supple, trachea midline  [CV] +S1/S2, no m/r/g appreciated  [Lungs] Clear to auscultations bilaterally, no adventitious lung sounds  [Abd] minimal lower abd ttp without rebound/guarding or peritoneal signs  [MSK] 5/5 upper extremity and lower extremity str bilaterally  [Skin] warm, dry, well-perfused  [Neuro] A&Ox3, Cranial Nerves II-XII intact

## 2021-02-21 NOTE — ED PROVIDER NOTE - PATIENT PORTAL LINK FT
You can access the FollowMyHealth Patient Portal offered by [facility] by registering at the following website: [email or web address]. By joining [employer]’s FollowMyHealth portal, you will also be able to view your health information using other applications (apps) compatible with our system.

## 2021-02-21 NOTE — ED PROVIDER NOTE - PROGRESS NOTE DETAILS
Hunter, PGY-2: Pt reassessed multiple times in the past several hours. Doing well, pain improved, Gyn saw patient, return in 48 hour for repeat hcg (patient prefers to find her own ob-gyn which is okay as well per gyn resident).

## 2021-02-21 NOTE — ED PROVIDER NOTE - CLINICAL SUMMARY MEDICAL DECISION MAKING FREE TEXT BOX
26 y/o , found here to have pregnancy of unknown location, LMP , abd exam benign. gyn saw patient, return for 48-hr rpt b-hcg, pt will also try to find her own ob-gyn. vss, d/c in good condition.

## 2021-02-22 ENCOUNTER — EMERGENCY (EMERGENCY)
Facility: HOSPITAL | Age: 26
LOS: 1 days | Discharge: HOME | End: 2021-02-22
Attending: EMERGENCY MEDICINE | Admitting: EMERGENCY MEDICINE
Payer: COMMERCIAL

## 2021-02-22 VITALS
RESPIRATION RATE: 16 BRPM | OXYGEN SATURATION: 100 % | DIASTOLIC BLOOD PRESSURE: 69 MMHG | HEART RATE: 80 BPM | TEMPERATURE: 98 F | SYSTOLIC BLOOD PRESSURE: 135 MMHG

## 2021-02-22 VITALS
HEART RATE: 84 BPM | SYSTOLIC BLOOD PRESSURE: 118 MMHG | DIASTOLIC BLOOD PRESSURE: 65 MMHG | HEIGHT: 67 IN | RESPIRATION RATE: 16 BRPM | OXYGEN SATURATION: 100 % | TEMPERATURE: 98 F

## 2021-02-22 LAB
ALBUMIN SERPL ELPH-MCNC: 4 G/DL — SIGNIFICANT CHANGE UP (ref 3.3–5)
ALP SERPL-CCNC: 56 U/L — SIGNIFICANT CHANGE UP (ref 40–120)
ALT FLD-CCNC: 19 U/L — SIGNIFICANT CHANGE UP (ref 4–33)
ANION GAP SERPL CALC-SCNC: 10 MMOL/L — SIGNIFICANT CHANGE UP (ref 7–14)
APPEARANCE UR: CLEAR — SIGNIFICANT CHANGE UP
AST SERPL-CCNC: 16 U/L — SIGNIFICANT CHANGE UP (ref 4–32)
BASOPHILS # BLD AUTO: 0.06 K/UL — SIGNIFICANT CHANGE UP (ref 0–0.2)
BASOPHILS NFR BLD AUTO: 0.7 % — SIGNIFICANT CHANGE UP (ref 0–2)
BILIRUB SERPL-MCNC: 0.3 MG/DL — SIGNIFICANT CHANGE UP (ref 0.2–1.2)
BILIRUB UR-MCNC: NEGATIVE — SIGNIFICANT CHANGE UP
BUN SERPL-MCNC: 7 MG/DL — SIGNIFICANT CHANGE UP (ref 7–23)
CALCIUM SERPL-MCNC: 9.4 MG/DL — SIGNIFICANT CHANGE UP (ref 8.4–10.5)
CHLORIDE SERPL-SCNC: 103 MMOL/L — SIGNIFICANT CHANGE UP (ref 98–107)
CO2 SERPL-SCNC: 20 MMOL/L — LOW (ref 22–31)
COLOR SPEC: SIGNIFICANT CHANGE UP
CREAT SERPL-MCNC: 0.67 MG/DL — SIGNIFICANT CHANGE UP (ref 0.5–1.3)
CULTURE RESULTS: SIGNIFICANT CHANGE UP
DIFF PNL FLD: NEGATIVE — SIGNIFICANT CHANGE UP
EOSINOPHIL # BLD AUTO: 0.06 K/UL — SIGNIFICANT CHANGE UP (ref 0–0.5)
EOSINOPHIL NFR BLD AUTO: 0.7 % — SIGNIFICANT CHANGE UP (ref 0–6)
GLUCOSE SERPL-MCNC: 92 MG/DL — SIGNIFICANT CHANGE UP (ref 70–99)
GLUCOSE UR QL: NEGATIVE — SIGNIFICANT CHANGE UP
HCG SERPL-ACNC: 214.3 MIU/ML — SIGNIFICANT CHANGE UP
HCT VFR BLD CALC: 41.2 % — SIGNIFICANT CHANGE UP (ref 34.5–45)
HGB BLD-MCNC: 13.7 G/DL — SIGNIFICANT CHANGE UP (ref 11.5–15.5)
IANC: 5.4 K/UL — SIGNIFICANT CHANGE UP (ref 1.5–8.5)
IMM GRANULOCYTES NFR BLD AUTO: 0.9 % — SIGNIFICANT CHANGE UP (ref 0–1.5)
KETONES UR-MCNC: NEGATIVE — SIGNIFICANT CHANGE UP
LEUKOCYTE ESTERASE UR-ACNC: NEGATIVE — SIGNIFICANT CHANGE UP
LYMPHOCYTES # BLD AUTO: 2.97 K/UL — SIGNIFICANT CHANGE UP (ref 1–3.3)
LYMPHOCYTES # BLD AUTO: 32.2 % — SIGNIFICANT CHANGE UP (ref 13–44)
MCHC RBC-ENTMCNC: 28.3 PG — SIGNIFICANT CHANGE UP (ref 27–34)
MCHC RBC-ENTMCNC: 33.3 GM/DL — SIGNIFICANT CHANGE UP (ref 32–36)
MCV RBC AUTO: 85.1 FL — SIGNIFICANT CHANGE UP (ref 80–100)
MONOCYTES # BLD AUTO: 0.65 K/UL — SIGNIFICANT CHANGE UP (ref 0–0.9)
MONOCYTES NFR BLD AUTO: 7 % — SIGNIFICANT CHANGE UP (ref 2–14)
NEUTROPHILS # BLD AUTO: 5.4 K/UL — SIGNIFICANT CHANGE UP (ref 1.8–7.4)
NEUTROPHILS NFR BLD AUTO: 58.5 % — SIGNIFICANT CHANGE UP (ref 43–77)
NITRITE UR-MCNC: NEGATIVE — SIGNIFICANT CHANGE UP
NRBC # BLD: 0 /100 WBCS — SIGNIFICANT CHANGE UP
NRBC # FLD: 0 K/UL — SIGNIFICANT CHANGE UP
PH UR: 6 — SIGNIFICANT CHANGE UP (ref 5–8)
PLATELET # BLD AUTO: 314 K/UL — SIGNIFICANT CHANGE UP (ref 150–400)
POTASSIUM SERPL-MCNC: 3.8 MMOL/L — SIGNIFICANT CHANGE UP (ref 3.5–5.3)
POTASSIUM SERPL-SCNC: 3.8 MMOL/L — SIGNIFICANT CHANGE UP (ref 3.5–5.3)
PROT SERPL-MCNC: 7.6 G/DL — SIGNIFICANT CHANGE UP (ref 6–8.3)
PROT UR-MCNC: NEGATIVE — SIGNIFICANT CHANGE UP
RBC # BLD: 4.84 M/UL — SIGNIFICANT CHANGE UP (ref 3.8–5.2)
RBC # FLD: 12.8 % — SIGNIFICANT CHANGE UP (ref 10.3–14.5)
SODIUM SERPL-SCNC: 133 MMOL/L — LOW (ref 135–145)
SP GR SPEC: 1.01 — LOW (ref 1.01–1.02)
SPECIMEN SOURCE: SIGNIFICANT CHANGE UP
UROBILINOGEN FLD QL: SIGNIFICANT CHANGE UP
WBC # BLD: 9.22 K/UL — SIGNIFICANT CHANGE UP (ref 3.8–10.5)
WBC # FLD AUTO: 9.22 K/UL — SIGNIFICANT CHANGE UP (ref 3.8–10.5)

## 2021-02-22 PROCEDURE — 76830 TRANSVAGINAL US NON-OB: CPT | Mod: 26

## 2021-02-22 PROCEDURE — 99284 EMERGENCY DEPT VISIT MOD MDM: CPT

## 2021-02-22 RX ORDER — SODIUM CHLORIDE 9 MG/ML
1000 INJECTION INTRAMUSCULAR; INTRAVENOUS; SUBCUTANEOUS ONCE
Refills: 0 | Status: COMPLETED | OUTPATIENT
Start: 2021-02-22 | End: 2021-02-22

## 2021-02-22 RX ORDER — ACETAMINOPHEN 500 MG
650 TABLET ORAL ONCE
Refills: 0 | Status: COMPLETED | OUTPATIENT
Start: 2021-02-22 | End: 2021-02-22

## 2021-02-22 RX ADMIN — Medication 650 MILLIGRAM(S): at 09:42

## 2021-02-22 RX ADMIN — SODIUM CHLORIDE 1000 MILLILITER(S): 9 INJECTION INTRAMUSCULAR; INTRAVENOUS; SUBCUTANEOUS at 09:42

## 2021-02-22 NOTE — ED PROVIDER NOTE - PHYSICAL EXAMINATION
Dr Chilel  nontoxic female. no distress mmm  normal S1-S2   No resp distress. able to speak in full and clear sentences. no wheeze, rales or stridor.  soft nondistended mild lower mid tenderness no guarding no rebound  ambulatory AO3

## 2021-02-22 NOTE — ED PROVIDER NOTE - PROGRESS NOTE DETAILS
HERIBERTO Adame: Pt signed out to me from Dr. Chilel to f/u HCG, TVUS and GYn rec. HCG appropriately rising, however no IUP on TVUS. Pt seen by gyn, recommend follow up in 48 hours for beta check. Pt requesting termination, counseling on needing a confirmed IUP prior to terminating. Can follow up at [facility] or in the ED within 2 days. Pt understands plan, appears more comfortable after Tylenol.

## 2021-02-22 NOTE — CONSULT NOTE ADULT - ASSESSMENT
24 y/o  4w5d by LMP  presenting with lower abdominal cramping. Patient seen in ED for same thing yesterday, determined to have PUL. bHCG 114->214. TVUS still with PUL. Hct 41.2. Vital signs stable, physical exam overall benign.   - patient to follow up in 48h with private OB for bHCG follow up  - ectopic precautions given    d/w Dr. Rolando Olmedo PGY1 24 y/o  4w5d by LMP  presenting with lower abdominal cramping. Patient seen in ED for same thing yesterday, determined to have PUL. bHCG 114->214. TVUS still with PUL. Hct 41.2. Vital signs stable, physical exam overall benign.       - patient to follow up in 48h with private OB ([facility]) for bHCG follow up   - patietnt desires termination   - ectopic precautions given      d/w Dr. Rolando Olmedo PGY1 26 y/o  4w5d by LMP  presenting with lower abdominal cramping. Patient seen in ED for same thing yesterday, determined to have PUL. bHCG 114->214. TVUS still with PUL. Hct 41.2. Vital signs stable, physical exam overall benign, no acute concern for ruptured ectopic pregnancy.      - Patient desires termination of pregnancy.    - Patient advised to follow-up in 48 hours for repeat bHCG with [facility] or [facility] ED if she is unable to secure an appointment.    - Discussed differential diagnosis of pregnancy of unknown location, including: Early intrauterine pregnancy, ectopic pregnancy, and failed intrauterine pregnancy. Patient counseled regarding importance of identifying the location of the pregnancy for further management decisions; and the importance of close follow-up to trend bHCG. She verbalized understanding and agreement.   - Ruptured ectopic precautions reviewed, pt advised to return to the ED if she experiences intense abdominal pain, inability to tolerate PO or sudden increase in vaginal bleeding.     d/w Dr. Rolando Olmedo PGY1

## 2021-02-22 NOTE — ED PROVIDER NOTE - OBJECTIVE STATEMENT
Dr Chilel  25y  @4w4d by LMP  pw abdominal cramping x 5 days. Lower abdominal cramps, described as menstrual cramps. no vaginal bleeding. no discharge. no n/v/d no f/c no dysuria. Seen in ED a day ago, told to follow up in 48 hours last night cramps worsening.

## 2021-02-22 NOTE — CONSULT NOTE ADULT - SUBJECTIVE AND OBJECTIVE BOX
RITU CHAVEZ  25y  Female [id number]    HPI:         Name of GYN Physician:     POB:      Pgyn: Denies fibroids, cysts, STI's, Abnormal pap smears   PMH:  PSH:    Home meds:     Hospital Meds:   MEDICATIONS  (STANDING):    MEDICATIONS  (PRN):      Social History:  Denies smoking use, drug use, alcohol use.     Vital Signs Last 24 Hrs  T(C): 36.6 (2021 13:42), Max: 36.6 (2021 08:39)  T(F): 97.9 (2021 13:42), Max: 97.9 (2021 08:39)  HR: 80 (2021 13:42) (80 - 84)  BP: 135/69 (2021 13:42) (118/65 - 135/69)  BP(mean): --  RR: 16 (2021 13:42) (16 - 16)  SpO2: 100% (2021 13:42) (100% - 100%)    Physical Exam:   General: sitting comfortably in bed, NAD   CV: RR, S1S2 present, no m/r/g  Lungs: CTA b/l  Back: No CVA tenderness  Abd: Soft, non-tender, non-distended.  Bowel sounds present.    :  No bleeding on pad.    External labia wnl.  Bimanual exam with no cervical motion tenderness, uterus wnl, adnexa non palpable b/l.    Speculum Exam: No active bleeding from os.  Physiologic discharge.    Ext: non-tender b/l, no edema     LABS:                            13.7   9.22  )-----------( 314      ( 2021 09:54 )             41.2     02-    133<L>  |  103  |  7   ----------------------------<  92  3.8   |  20<L>  |  0.67    Ca    9.4      2021 09:54    TPro  7.6  /  Alb  4.0  /  TBili  0.3  /  DBili  x   /  AST  16  /  ALT  19  /  AlkPhos  56  02-22    I&O's Detail      Urinalysis Basic - ( 2021 09:54 )    Color: Light Yellow / Appearance: Clear / S.008 / pH: x  Gluc: x / Ketone: Negative  / Bili: Negative / Urobili: <2 mg/dL   Blood: x / Protein: Negative / Nitrite: Negative   Leuk Esterase: Negative / RBC: x / WBC x   Sq Epi: x / Non Sq Epi: x / Bacteria: x        RADIOLOGY & ADDITIONAL STUDIES:  TVUS:  RITU CHAVEZ  25y  Female [id number]    HPI:   24 y/o  at 4w5d by LMP  presenting with bilateral lower abdominal pain. Patient presented to ED yesterday with cramping, bHCG 114.5 and TVUS with no IUP with no adnexal abnormalities. Patient was discharged and told to follow up in 48h however returns today with persistent cramping. Tylenol has not helped. Denies any n/v. No vaginal bleeding. No chest pain, SOB, fevers/chills, or any other acute concerns.    OBHx:   GYNHx: denies h/o abnormal paps, STIs, fibroids, cysts  PMH: hypothyroid  PSH: denies  Meds:   All: NKDA  SH: denies substance use      Vital Signs Last 24 Hrs  T(C): 36.6 (2021 13:42), Max: 36.6 (2021 08:39)  T(F): 97.9 (2021 13:42), Max: 97.9 (2021 08:39)  HR: 80 (2021 13:42) (80 - 84)  BP: 135/69 (2021 13:42) (118/65 - 135/69)  BP(mean): --  RR: 16 (2021 13:42) (16 - 16)  SpO2: 100% (2021 13:42) (100% - 100%)    Physical Exam:   General: sitting comfortably in bed, NAD   CV: RRR  Lungs: CTAB  Abd: Soft, non-tender, non-distended   Speculum Exam: No active bleeding from os  Bimanual: mild left adnexal tenderness, no CMT or uterine tenderness  Ext: non-tender b/l, no edema     LABS:    bHC.3                        13.7   9.22  )-----------( 314      ( 2021 09:54 )             41.2     02    133<L>  |  103  |  7   ----------------------------<  92  3.8   |  20<L>  |  0.67    Ca    9.4      2021 09:54    TPro  7.6  /  Alb  4.0  /  TBili  0.3  /  DBili  x   /  AST  16  /  ALT  19  /  AlkPhos  56      I&O's Detail      Urinalysis Basic - ( 2021 09:54 )    Color: Light Yellow / Appearance: Clear / S.008 / pH: x  Gluc: x / Ketone: Negative  / Bili: Negative / Urobili: <2 mg/dL   Blood: x / Protein: Negative / Nitrite: Negative   Leuk Esterase: Negative / RBC: x / WBC x   Sq Epi: x / Non Sq Epi: x / Bacteria: x        RADIOLOGY & ADDITIONAL STUDIES:  TVUS:     < from: US Transvaginal (21 @ 11:35) >  EXAM:  US TRANSVAGINAL        PROCEDURE DATE:  2021         INTERPRETATION:  CLINICAL INFORMATION: Lower abdominal cramping. Pregnant.    LMP: 2021    COMPARISON: None available.    TECHNIQUE:  Endovaginal pelvic sonogram only. Color and Spectral Doppler was performed.    FINDINGS:    Uterus: 7.3 cm x 5.0 cm x 4.5 cm. Retroverted and retroflexed. No intrauterine gestation identified.  Endometrium: 1.6 cm. Thickened.    Right ovary: 3.7 cm x 2.7 cm x 1.8 cm. 1.8 cm corpus luteum. Normal arterial waveform. No evidence of extraovarian adnexal mass.  Left ovary: 3.2 cm x 2.3 cm x 2.5 cm. Additional 1.8 cm corpus luteum. Normal arterial waveform. No evidence of extraovarian adnexal mass.    Fluid: Trace free fluid, likely physiologic.    IMPRESSION:  Pregnancy of unknown location. Follow-up serial quantitative serum beta-hCG. Repeat ultrasound as clinically indicated.      < end of copied text >   RITU CHAVEZ  25y  Female [id number]    HPI:   24 y/o  at 4w5d by LMP  presenting with bilateral lower abdominal pain. Patient presented to ED yesterday with cramping, bHCG 114.5 and TVUS with no IUP or adnexal abnormalities. Patient was discharged and told to follow up in 48h however returns today with persistent cramping particularly on the left side. Tylenol has not helped. Denies any n/v. No vaginal bleeding. No chest pain, SOB, fevers/chills, or any other acute concerns.    OBHx:   GYNHx: denies h/o abnormal paps, STIs, fibroids, cysts  PMH: hypothyroid  PSH: denies  Meds:   All: Ciprofloxacin ( Chest pain)  SH: denies substance use      Vital Signs Last 24 Hrs  T(C): 36.6 (2021 13:42), Max: 36.6 (2021 08:39)  T(F): 97.9 (2021 13:42), Max: 97.9 (2021 08:39)  HR: 80 (2021 13:42) (80 - 84)  BP: 135/69 (2021 13:42) (118/65 - 135/69)  BP(mean): --  RR: 16 (2021 13:42) (16 - 16)  SpO2: 100% (2021 13:42) (100% - 100%)    Physical Exam:   General: sitting comfortably in bed, NAD   CV: RRR  Lungs: CTAB  Abd: Soft, non-tender, non-distended   Speculum Exam: No active bleeding from os  Bimanual: mild left adnexal tenderness, no CMT or uterine tenderness  Ext: non-tender b/l, no edema     LABS:    bHC.3                        13.7   9.22  )-----------( 314      ( 2021 09:54 )             41.2         133<L>  |  103  |  7   ----------------------------<  92  3.8   |  20<L>  |  0.67    Ca    9.4      2021 09:54    TPro  7.6  /  Alb  4.0  /  TBili  0.3  /  DBili  x   /  AST  16  /  ALT  19  /  AlkPhos  56      I&O's Detail      Urinalysis Basic - ( 2021 09:54 )    Color: Light Yellow / Appearance: Clear / S.008 / pH: x  Gluc: x / Ketone: Negative  / Bili: Negative / Urobili: <2 mg/dL   Blood: x / Protein: Negative / Nitrite: Negative   Leuk Esterase: Negative / RBC: x / WBC x   Sq Epi: x / Non Sq Epi: x / Bacteria: x        RADIOLOGY & ADDITIONAL STUDIES:  TVUS:     < from: US Transvaginal (21 @ 11:35) >  EXAM:  US TRANSVAGINAL        PROCEDURE DATE:  2021         INTERPRETATION:  CLINICAL INFORMATION: Lower abdominal cramping. Pregnant.    LMP: 2021    COMPARISON: None available.    TECHNIQUE:  Endovaginal pelvic sonogram only. Color and Spectral Doppler was performed.    FINDINGS:    Uterus: 7.3 cm x 5.0 cm x 4.5 cm. Retroverted and retroflexed. No intrauterine gestation identified.  Endometrium: 1.6 cm. Thickened.    Right ovary: 3.7 cm x 2.7 cm x 1.8 cm. 1.8 cm corpus luteum. Normal arterial waveform. No evidence of extraovarian adnexal mass.  Left ovary: 3.2 cm x 2.3 cm x 2.5 cm. Additional 1.8 cm corpus luteum. Normal arterial waveform. No evidence of extraovarian adnexal mass.    Fluid: Trace free fluid, likely physiologic.    IMPRESSION:  Pregnancy of unknown location. Follow-up serial quantitative serum beta-hCG. Repeat ultrasound as clinically indicated.      < end of copied text >   RITU CHAVEZ  25y  Female [id number]    26 y/o  at 4w5d by LMP  presenting with bilateral lower abdominal pain. Patient presented to ED yesterday with cramping, bHCG 114.5 and TVUS with no IUP or adnexal abnormalities. Patient was discharged and told to follow up in 48h however returns today with persistent cramping particularly on the left side. Tylenol has not helped. Denies any n/v. No vaginal bleeding. No chest pain, SOB, fevers/chills, or any other acute concerns.    OBHx:   GYNHx: denies h/o abnormal paps, STIs, fibroids, cysts  PMH: hypothyroid  PSH: denies  All: Ciprofloxacin ( Chest pain)  SH: denies substance use    Vital Signs Last 24 Hrs  T(C): 36.6 (2021 13:42), Max: 36.6 (2021 08:39)  T(F): 97.9 (2021 13:42), Max: 97.9 (2021 08:39)  HR: 80 (2021 13:42) (80 - 84)  BP: 135/69 (2021 13:42) (118/65 - 135/69)  RR: 16 (2021 13:42) (16 - 16)  SpO2: 100% (2021 13:42) (100% - 100%)    Physical Exam:   General: sitting comfortably in bed, NAD   CV: RRR  Lungs: CTAB  Abd: Soft, non-tender, non-distended   Speculum Exam: No active bleeding from os  Bimanual: mild left adnexal tenderness, no CMT or uterine tenderness  Ext: non-tender b/l, no edema     LABS:    bHC.3                        13.7   9.22  )-----------( 314      ( 2021 09:54 )             41.2     02-    133<L>  |  103  |  7   ----------------------------<  92  3.8   |  20<L>  |  0.67    Ca    9.4      2021 09:54    TPro  7.6  /  Alb  4.0  /  TBili  0.3  /  DBili  x   /  AST  16  /  ALT  19  /  AlkPhos  56      Urinalysis Basic - ( 2021 09:54 )    Color: Light Yellow / Appearance: Clear / S.008 / pH: x  Gluc: x / Ketone: Negative  / Bili: Negative / Urobili: <2 mg/dL   Blood: x / Protein: Negative / Nitrite: Negative   Leuk Esterase: Negative / RBC: x / WBC x   Sq Epi: x / Non Sq Epi: x / Bacteria: x    RADIOLOGY & ADDITIONAL STUDIES:  TVUS:     < from: US Transvaginal (21 @ 11:35) >  EXAM:  US TRANSVAGINAL    PROCEDURE DATE:  2021   INTERPRETATION:  CLINICAL INFORMATION: Lower abdominal cramping. Pregnant.  LMP: 2021  COMPARISON: None available.    TECHNIQUE:  Endovaginal pelvic sonogram only. Color and Spectral Doppler was performed.    FINDINGS:  Uterus: 7.3 cm x 5.0 cm x 4.5 cm. Retroverted and retroflexed. No intrauterine gestation identified.  Endometrium: 1.6 cm. Thickened.  Right ovary: 3.7 cm x 2.7 cm x 1.8 cm. 1.8 cm corpus luteum. Normal arterial waveform. No evidence of extraovarian adnexal mass.  Left ovary: 3.2 cm x 2.3 cm x 2.5 cm. Additional 1.8 cm corpus luteum. Normal arterial waveform. No evidence of extraovarian adnexal mass.    Fluid: Trace free fluid, likely physiologic.    IMPRESSION:  Pregnancy of unknown location. Follow-up serial quantitative serum beta-hCG. Repeat ultrasound as clinically indicated.    < end of copied text >

## 2021-02-22 NOTE — ED PROVIDER NOTE - PATIENT PORTAL LINK FT
You can access the FollowMyHealth Patient Portal offered by [facility] by registering at the following website: [email or web address]. By joining [employer]’s FollowMyHealth portal, you will also be able to view your health information using other applications (apps) compatible with our system.

## 2021-02-22 NOTE — ED ADULT NURSE NOTE - OBJECTIVE STATEMENT
pt received to room #9 with c/o lower pelvic cramping. was seen here 2 days ago for same complaint and was told shes pregnant and has bleeding cycts. denies vaginal bleeding. IV placed, labs drawn and sent. seen by PA. pending u/s. pt to go to [facility].

## 2021-02-22 NOTE — ED ADULT TRIAGE NOTE - CHIEF COMPLAINT QUOTE
pt was here yesterday, concern for an ectopic pregnancy, told to follow up in 2 days for repeat hcg, here for worsening cramping. denies any bleeding

## 2021-02-25 NOTE — CHART NOTE - NSCHARTNOTEFT_GEN_A_CORE
PGY1 GYN Note    Attempted to call patient regarding bHCG follow up. Unable to connect through and unable to leave voice message at primary number and emergency contact number. Will send certified letter.    Melida Parkinson PGY1

## 2021-03-19 NOTE — CHART NOTE - NSCHARTNOTEFT_GEN_A_CORE
GYN Chart Note    Second Certified Letter sent on 3/19 after multiple attempts to reach the patient were unsuccessful (#[id number]). Patient taken off [facility] [place] [place] list.     D/w Dr. Nu Bell

## 2021-03-27 NOTE — ED ADULT TRIAGE NOTE - PRO INTERPRETER NEED 2
[address] 
[facility] EMERGENCY DEPT 
[address] [place] [address] Oxana Khan [id number] 
[phone] Work/School Note Date: 3/27/2021 To Whom It May concern: 
 
Vane Vasquez was seen and treated today in the emergency room by the following provider(s): 
Attending Provider: Lars Macias MD.   
 
Vane Vasquez may return to work on 3/29/21. Sincerely, Yovany Foley MD 
 
 
 
 English

## 2021-08-17 NOTE — ED PROVIDER NOTE - NSTIMEPROVIDERCAREINITIATE_GEN_ER
Have Your Skin Lesions Been Treated?: not been treated Is This A New Presentation, Or A Follow-Up?: Skin Lesions 22-Feb-2021 09:14

## 2022-04-11 ENCOUNTER — APPOINTMENT (OUTPATIENT)
Dept: ORTHOPEDIC SURGERY | Facility: CLINIC | Age: 27
End: 2022-04-11
Payer: COMMERCIAL

## 2022-04-11 VITALS — WEIGHT: 205 LBS | HEIGHT: 67 IN | BODY MASS INDEX: 32.18 KG/M2

## 2022-04-11 DIAGNOSIS — Z78.9 OTHER SPECIFIED HEALTH STATUS: ICD-10-CM

## 2022-04-11 DIAGNOSIS — S83.282A OTHER TEAR OF LATERAL MENISCUS, CURRENT INJURY, LEFT KNEE, INITIAL ENCOUNTER: ICD-10-CM

## 2022-04-11 DIAGNOSIS — S83.281A OTHER TEAR OF LATERAL MENISCUS, CURRENT INJURY, RIGHT KNEE, INITIAL ENCOUNTER: ICD-10-CM

## 2022-04-11 PROBLEM — Z00.00 ENCOUNTER FOR PREVENTIVE HEALTH EXAMINATION: Status: ACTIVE | Noted: 2022-04-11

## 2022-04-11 PROCEDURE — 99204 OFFICE O/P NEW MOD 45 MIN: CPT

## 2022-04-11 PROCEDURE — 73564 X-RAY EXAM KNEE 4 OR MORE: CPT | Mod: RT

## 2022-04-11 RX ORDER — THYROID, PORCINE 90 MG/1
TABLET ORAL
Refills: 0 | Status: ACTIVE | COMMUNITY

## 2022-04-11 RX ORDER — MELOXICAM 15 MG/1
15 TABLET ORAL DAILY
Qty: 30 | Refills: 0 | Status: ACTIVE | COMMUNITY
Start: 2022-04-11 | End: 1900-01-01

## 2022-04-11 NOTE — PHYSICAL EXAM
[NL (0)] : extension 0 degrees [5___] : hamstring 5[unfilled]/5 [Equivocal] : equivocal Mariza [] : no deformities of quad tendon [[id number]] : flexion 130 degrees

## 2022-04-11 NOTE — DISCUSSION/SUMMARY
[de-identified] : General Dx discussion\par The patient was advised of the diagnosis. The natural history of the pathology was explained in full to the patient in layman's terms. All questions were answered. The risks and benefits of surgical and non-surgical treatment alternatives were explained in full to the patient. \par \par Will get MRIs to r/o lmt

## 2022-04-11 NOTE — HISTORY OF PRESENT ILLNESS
[7] : 7 [2] : 2 [Dull/Aching] : dull/aching [Constant] : constant [Work] : work [Stairs] : stairs [Full time] : Work status: full time [de-identified] : Knee pain for a few years that is worsening. Both knees buckle on her. [] : no [FreeTextEntry9] : aleve [de-identified] : activity, lifting

## 2022-04-15 ENCOUNTER — APPOINTMENT (OUTPATIENT)
Dept: MRI IMAGING | Facility: CLINIC | Age: 27
End: 2022-04-15
Payer: COMMERCIAL

## 2022-04-15 PROCEDURE — 73721 MRI JNT OF LWR EXTRE W/O DYE: CPT | Mod: RT

## 2022-04-21 ENCOUNTER — APPOINTMENT (OUTPATIENT)
Dept: ORTHOPEDIC SURGERY | Facility: CLINIC | Age: 27
End: 2022-04-21

## 2022-05-05 ENCOUNTER — APPOINTMENT (OUTPATIENT)
Dept: ORTHOPEDIC SURGERY | Facility: CLINIC | Age: 27
End: 2022-05-05

## 2022-05-05 ENCOUNTER — APPOINTMENT (OUTPATIENT)
Dept: ORTHOPEDIC SURGERY | Facility: CLINIC | Age: 27
End: 2022-05-05
Payer: COMMERCIAL

## 2022-05-05 VITALS — BODY MASS INDEX: 32.18 KG/M2 | WEIGHT: 205 LBS | HEIGHT: 67 IN

## 2022-05-05 DIAGNOSIS — M25.562 PAIN IN LEFT KNEE: ICD-10-CM

## 2022-05-05 DIAGNOSIS — M17.12 UNILATERAL PRIMARY OSTEOARTHRITIS, LEFT KNEE: ICD-10-CM

## 2022-05-05 DIAGNOSIS — M17.11 UNILATERAL PRIMARY OSTEOARTHRITIS, RIGHT KNEE: ICD-10-CM

## 2022-05-05 DIAGNOSIS — M25.561 PAIN IN RIGHT KNEE: ICD-10-CM

## 2022-05-05 PROCEDURE — 99213 OFFICE O/P EST LOW 20 MIN: CPT

## 2022-05-05 NOTE — PHYSICAL EXAM
[NL (0)] : extension 0 degrees [5___] : hamstring 5[unfilled]/5 [Equivocal] : equivocal Mariza [] : no deformities of patellar tendon [TWNoteComboBox7] : flexion 130 degrees

## 2022-05-05 NOTE — HISTORY OF PRESENT ILLNESS
[3] : 3 [9] : 9 [Dull/Aching] : dull/aching [] : Post Surgical Visit: no [FreeTextEntry1] : both knees  [FreeTextEntry5] : pt is here to eval their MRI [de-identified] : pt is here to eval their MRI

## 2022-05-05 NOTE — DISCUSSION/SUMMARY
[de-identified] : General Dx Discussion\par The patient was advised of the diagnosis. The natural history of the pathology was explained in full to the patient in layman's terms. All questions were answered. The risks and benefits of surgical and non-surgical treatment alternatives were explained in full to the patient.\par \par mris reviewed will get PT will get auth for euflexxa bilat knees \par weight loss discussed

## 2022-05-06 RX ORDER — HYALURONATE SODIUM 20 MG/2 ML
20 SYRINGE (ML) INTRAARTICULAR
Qty: 6 | Refills: 0 | Status: ACTIVE | COMMUNITY
Start: 2022-05-06 | End: 1900-01-01

## 2022-06-09 NOTE — ED ADULT TRIAGE NOTE - SPO2 (%)
"Chief Complaint  Urinary Tract Infection (Burning sensation, urgency, increase frequency x 2 wks)    Subjective        Vidhi Lopez presents to [facility]  History of Present Illness  Burning with urination x 2 weeks, and urgency and flank pain, no fever  Objective   Vital Signs:  /66 (BP Location: Right arm, Patient Position: Sitting, Cuff Size: Large Adult)   Pulse 63   Temp 98.4 °F (36.9 °C) (Infrared)   Resp 15   Ht 162.6 cm (64\")   Wt 78.1 kg (172 lb 3.2 oz)   SpO2 95%   BMI 29.56 kg/m²   Estimated body mass index is 29.56 kg/m² as calculated from the following:    Height as of this encounter: 162.6 cm (64\").    Weight as of this encounter: 78.1 kg (172 lb 3.2 oz).          Physical Exam  Vitals and nursing note reviewed.   Constitutional:       General: She is not in acute distress.     Appearance: Normal appearance. She is well-developed. She is not ill-appearing, toxic-appearing or diaphoretic.   HENT:      Head: Normocephalic and atraumatic.      Right Ear: Tympanic membrane, ear canal and external ear normal. There is no impacted cerumen.      Left Ear: Tympanic membrane, ear canal and external ear normal. There is no impacted cerumen.      Nose: Nose normal. No congestion or rhinorrhea.      Mouth/Throat:      Mouth: Mucous membranes are moist.      Pharynx: Oropharynx is clear. No oropharyngeal exudate or posterior oropharyngeal erythema.   Eyes:      General: No scleral icterus.        Right eye: No discharge.         Left eye: No discharge.      Extraocular Movements: Extraocular movements intact.      Conjunctiva/sclera: Conjunctivae normal.      Pupils: Pupils are equal, round, and reactive to light.   Neck:      Thyroid: No thyromegaly.      Vascular: No carotid bruit or JVD.      Trachea: No tracheal deviation.   Cardiovascular:      Rate and Rhythm: Normal rate and regular rhythm.      Heart sounds: Normal heart sounds. No murmur heard.    No friction " rub. No gallop.   Pulmonary:      Effort: Pulmonary effort is normal. No respiratory distress.      Breath sounds: Normal breath sounds. No stridor. No wheezing, rhonchi or rales.   Chest:      Chest wall: No tenderness.   Abdominal:      General: Bowel sounds are normal. There is no distension.      Palpations: Abdomen is soft. There is no mass.      Tenderness: There is no abdominal tenderness. There is no right CVA tenderness, left CVA tenderness, guarding or rebound.      Hernia: No hernia is present.   Musculoskeletal:         General: Normal range of motion.      Cervical back: Normal range of motion and neck supple. No rigidity or tenderness.      Right lower leg: No edema.      Left lower leg: No edema.   Lymphadenopathy:      Cervical: No cervical adenopathy.   Skin:     General: Skin is warm and dry.      Coloration: Skin is not jaundiced.   Neurological:      General: No focal deficit present.      Mental Status: She is alert and oriented to person, place, and time. Mental status is at baseline.      Sensory: No sensory deficit.      Motor: No weakness or abnormal muscle tone.      Coordination: Coordination normal.      Gait: Gait normal.      Deep Tendon Reflexes: Reflexes normal.   Psychiatric:         Mood and Affect: Mood normal.         Behavior: Behavior normal.         Thought Content: Thought content normal.         Judgment: Judgment normal.        Result Review :                Assessment and Plan   Diagnoses and all orders for this visit:    1. Urinary tract infection without hematuria, site unspecified (Primary)  -     Cancel: POCT urinalysis dipstick, automated  -     POC Urinalysis Dipstick, Automated  -     Urine Culture - Urine, Urine, Clean Catch  -     sulfamethoxazole-trimethoprim (Bactrim) 400-80 MG tablet; Take 1 tablet by mouth 2 (Two) Times a Day.  Dispense: 14 tablet; Refill: 0    2. Hypertension, unspecified type  -     CBC & Differential; Future  -     Comprehensive Metabolic  Panel; Future  -     Hemoglobin A1c; Future  -     Lipid Panel; Future  -     TSH; Future    3. Type 2 diabetes mellitus with other specified complication, with long-term current use of insulin (HCC)  -     CBC & Differential; Future  -     Comprehensive Metabolic Panel; Future  -     Hemoglobin A1c; Future  -     Lipid Panel; Future  -     TSH; Future    4. High cholesterol  -     CBC & Differential; Future  -     Comprehensive Metabolic Panel; Future  -     Hemoglobin A1c; Future  -     Lipid Panel; Future  -     TSH; Future  -     atorvastatin (LIPITOR) 80 MG tablet; Take 1 tablet by mouth Daily.  Dispense: 90 tablet; Refill: 3             Follow Up   No follow-ups on file.  Patient was given instructions and counseling regarding her condition or for health maintenance advice. Please see specific information pulled into the AVS if appropriate.   Rx for CGM Johnnie     100

## 2022-11-25 NOTE — ED PROVIDER NOTE - NSFOLLOWUPINSTRUCTIONS_ED_ALL_ED_FT
Follow up with your OBGYN in 48 hours for repeat blood work, bring copies of all reports with you. Continue Tylenol as needed for pain. Return to the ER for worsening symptoms, vaginal bleeding, worsening abdominal pain, fever, or any other concerns.
5

## 2023-11-24 NOTE — ED PROVIDER NOTE - HEME/LYMPH NEGATIVE STATEMENT, MLM
Assumed care of this pt from [place], [place]. Patient resting in the chair, abx running, no needs at this time, call light within reach. no anemia, no easy bruising, no jaundice, no swollen lymph nodes.

## 2024-11-27 NOTE — ED PROVIDER NOTE - CLINICAL SUMMARY MEDICAL DECISION MAKING FREE TEXT BOX
Pt needs new order for PT. Order placed.   
24 y.o female with a PMHx of hypothyroidism-non-compliant with medication, presents to the ED complaining of having substernal over past few months worsening over the past day- chest pain- ekg, chest xray, ibuprofen, TSH